# Patient Record
Sex: MALE | Race: OTHER | HISPANIC OR LATINO | URBAN - METROPOLITAN AREA
[De-identification: names, ages, dates, MRNs, and addresses within clinical notes are randomized per-mention and may not be internally consistent; named-entity substitution may affect disease eponyms.]

---

## 2019-07-15 ENCOUNTER — EMERGENCY (EMERGENCY)
Facility: HOSPITAL | Age: 54
LOS: 1 days | Discharge: HOME | End: 2019-07-15
Attending: EMERGENCY MEDICINE
Payer: SELF-PAY

## 2019-07-15 VITALS
OXYGEN SATURATION: 97 % | RESPIRATION RATE: 18 BRPM | SYSTOLIC BLOOD PRESSURE: 134 MMHG | HEART RATE: 61 BPM | DIASTOLIC BLOOD PRESSURE: 78 MMHG | TEMPERATURE: 98 F

## 2019-07-15 VITALS — WEIGHT: 154.1 LBS | HEIGHT: 60 IN

## 2019-07-15 PROCEDURE — T1013: CPT

## 2019-07-15 PROCEDURE — 99284 EMERGENCY DEPT VISIT MOD MDM: CPT

## 2019-07-15 PROCEDURE — 99284 EMERGENCY DEPT VISIT MOD MDM: CPT | Mod: 25

## 2019-07-15 PROCEDURE — 70450 CT HEAD/BRAIN W/O DYE: CPT

## 2019-07-15 PROCEDURE — 70450 CT HEAD/BRAIN W/O DYE: CPT | Mod: 26

## 2019-07-15 NOTE — ED STATDOCS - CLINICAL SUMMARY MEDICAL DECISION MAKING FREE TEXT BOX
Pt with complete left sided facial weakness consistent with Bell's Palsy. Sent in for CT head for earlier event. Will do perform head CT and confirm pt on steroids.

## 2019-07-15 NOTE — ED ADULT TRIAGE NOTE - CHIEF COMPLAINT QUOTE
Pt sent from [facility] clinic for eval of left sided facial droop and numbness since Friday. Pt states that he also has decreased hearing on left side. +facial asymmetry noted. No additional deficits noted.

## 2019-07-15 NOTE — ED STATDOCS - OBJECTIVE STATEMENT
Pt is a 52 y/o M, with PMHx of DM, presenting to the ED with c/o left sided facial numbness. Hx obtained by family at bedside. Pt developed the sxs in the left side of his lips three days ago. Spread to the entire left side of his face. Pt went to Urgent Care yesterday, was diagnosed with Bell's Palsy, and discharged with PO medications. Family states that today while at work, pt noticed that he was unable to drink water properly, and noticed that his left face was "twisty." Pt went to [facility] clinic for eval, has paperwork suggesting Bell's, but was referred to the ED for stroke rule out given comorbidities. Pt admits to mild headache. Denies fever, recent URI sxs, nausea, vomiting, any other unilateral weakness, CP, and SOB.

## 2019-07-15 NOTE — ED STATDOCS - ATTENDING CONTRIBUTION TO CARE
I, Dr. Lynn, performed the initial face to face bedside interview with this patient regarding history of present illness, review of symptoms and relevant past medical, social and family history.  I completed an independent physical examination.  I was the initial provider who evaluated this patient. I have signed out the follow up of any pending tests (i.e. labs, radiological studies) to the ACP.  I have communicated the patient’s plan of care and disposition with the ACP.

## 2019-07-18 PROBLEM — E11.9 TYPE 2 DIABETES MELLITUS WITHOUT COMPLICATIONS: Chronic | Status: ACTIVE | Noted: 2019-07-15

## 2019-07-19 PROBLEM — Z00.00 ENCOUNTER FOR PREVENTIVE HEALTH EXAMINATION: Status: ACTIVE | Noted: 2019-07-19

## 2019-08-01 ENCOUNTER — APPOINTMENT (OUTPATIENT)
Dept: NEUROSURGERY | Facility: CLINIC | Age: 54
End: 2019-08-01
Payer: SELF-PAY

## 2019-08-01 VITALS
HEART RATE: 56 BPM | DIASTOLIC BLOOD PRESSURE: 76 MMHG | HEIGHT: 60 IN | SYSTOLIC BLOOD PRESSURE: 132 MMHG | BODY MASS INDEX: 29.84 KG/M2 | WEIGHT: 152 LBS | TEMPERATURE: 98.4 F

## 2019-08-01 DIAGNOSIS — Z78.9 OTHER SPECIFIED HEALTH STATUS: ICD-10-CM

## 2019-08-01 DIAGNOSIS — Z86.39 PERSONAL HISTORY OF OTHER ENDOCRINE, NUTRITIONAL AND METABOLIC DISEASE: ICD-10-CM

## 2019-08-01 DIAGNOSIS — D32.9 BENIGN NEOPLASM OF MENINGES, UNSPECIFIED: ICD-10-CM

## 2019-08-01 DIAGNOSIS — Z87.891 PERSONAL HISTORY OF NICOTINE DEPENDENCE: ICD-10-CM

## 2019-08-01 PROCEDURE — 99203 OFFICE O/P NEW LOW 30 MIN: CPT

## 2019-08-01 RX ORDER — METFORMIN HYDROCHLORIDE 500 MG/1
500 TABLET, FILM COATED, EXTENDED RELEASE ORAL
Refills: 0 | Status: ACTIVE | COMMUNITY

## 2019-08-01 RX ORDER — ASPIRIN ENTERIC COATED TABLETS 81 MG 81 MG/1
81 TABLET, DELAYED RELEASE ORAL
Refills: 0 | Status: ACTIVE | COMMUNITY

## 2019-08-01 RX ORDER — PRAVASTATIN SODIUM 40 MG/1
40 TABLET ORAL
Refills: 0 | Status: ACTIVE | COMMUNITY

## 2019-08-01 RX ORDER — PERPHENAZINE 2 MG
TABLET ORAL
Refills: 0 | Status: ACTIVE | COMMUNITY

## 2019-08-01 RX ORDER — LISINOPRIL 5 MG/1
5 TABLET ORAL
Refills: 0 | Status: ACTIVE | COMMUNITY

## 2019-08-01 RX ORDER — CHOLECALCIFEROL (VITAMIN D3) 25 MCG
TABLET ORAL
Refills: 0 | Status: ACTIVE | COMMUNITY

## 2019-08-02 NOTE — HISTORY OF PRESENT ILLNESS
[< 3 months] : less than 3 months [FreeTextEntry1] : left facial numbness and bilateral temporal headache [de-identified] : Mr. Rust is a 53 year old male who presents for initial neurosurgical evaluation of a calcified meningioma.  PMH includes HTN.  He mentions he presented to [facility] ER 7/15/2019 with complaints of left facial numbness and headache which prompted CT head imaging to r/o CVA. Discharged in stable condition and recommended to follow up outpatient basis.  At today's visit, he reports intermittent bilateral mild  temporal headaches that come and goes. Pain intensity 2/10.  Denies any nausea or vomiting.  No visual disturbances with exception of intermittent left eye twitching.  Left facial numbness has resolved.    He has never had any seizures.  Denies any numbness/tingling or weakness of the extremities.  No difficulty with ambulation or balance.

## 2019-08-02 NOTE — REVIEW OF SYSTEMS
[As Noted in HPI] : as noted in HPI [Negative] : Heme/Lymph [Numbness] : no numbness [Seizures] : no convulsions [Tingling] : no tingling [Difficulty Walking] : no difficulty walking [Dizziness] : no dizziness

## 2019-08-02 NOTE — REVIEW OF SYSTEMS
[As Noted in HPI] : as noted in HPI [Negative] : Heme/Lymph [Numbness] : no numbness [Tingling] : no tingling [Seizures] : no convulsions [Dizziness] : no dizziness [Difficulty Walking] : no difficulty walking

## 2019-08-02 NOTE — DATA REVIEWED
[de-identified] : IMPRESSION:  5.6 mm calcified meningioma anterior inferior sagittal falx  No acute intracranial findings.

## 2019-08-02 NOTE — ASSESSMENT
[FreeTextEntry1] : Mr. Rust presents with above history and imaging.  He is neurologically intact at today's visit.  He denies change in smell or taste.  Left facial numbness has resolved.   Patient has a CT  finding of a calcified meningioma in the right anterior sagittal falx.  Patient will obtain an MRI brain w/wo contrast to further assess this calcified meningioma. He should follow up after imaging for review.  He and his daughter know to call the office if there are any new or worsening symptoms.

## 2019-08-02 NOTE — DATA REVIEWED
[de-identified] : IMPRESSION:  5.6 mm calcified meningioma anterior inferior sagittal falx  No acute intracranial findings.

## 2019-08-02 NOTE — HISTORY OF PRESENT ILLNESS
[< 3 months] : less than 3 months [FreeTextEntry1] : left facial numbness and bilateral temporal headache [de-identified] : Mr. Rust is a 53 year old male who presents for initial neurosurgical evaluation of a calcified meningioma.  PMH includes HTN.  He mentions he presented to [facility] ER 7/15/2019 with complaints of left facial numbness and headache which prompted CT head imaging to r/o CVA. Discharged in stable condition and recommended to follow up outpatient basis.  At today's visit, he reports intermittent bilateral mild  temporal headaches that come and goes. Pain intensity 2/10.  Denies any nausea or vomiting.  No visual disturbances with exception of intermittent left eye twitching.  Left facial numbness has resolved.    He has never had any seizures.  Denies any numbness/tingling or weakness of the extremities.  No difficulty with ambulation or balance.

## 2019-08-02 NOTE — PHYSICAL EXAM
[General Appearance - Alert] : alert [General Appearance - In No Acute Distress] : in no acute distress [General Appearance - Well Nourished] : well nourished [General Appearance - Well Developed] : well developed [Oriented To Time, Place, And Person] : oriented to person, place, and time [Impaired Insight] : insight and judgment were intact [Affect] : the affect was normal [Mood] : the mood was normal [Person] : oriented to person [Place] : oriented to place [Time] : oriented to time [Short Term Intact] : short term memory intact [Concentration Intact] : normal concentrating ability [Fluency] : fluency intact [Comprehension] : comprehension intact [Cranial Nerves Optic (II)] : visual acuity intact bilaterally,  pupils equal round and reactive to light [Cranial Nerves Oculomotor (III)] : extraocular motion intact [Cranial Nerves Trigeminal (V)] : facial sensation intact symmetrically [Cranial Nerves Facial (VII)] : face symmetrical [Cranial Nerves Glossopharyngeal (IX)] : tongue and palate midline [Cranial Nerves Accessory (XI - Cranial And Spinal)] : head turning and shoulder shrug symmetric [Cranial Nerves Hypoglossal (XII)] : there was no tongue deviation with protrusion [Motor Tone] : muscle tone was normal in all four extremities [Motor Strength] : muscle strength was normal in all four extremities [Sensation Tactile Decrease] : light touch was intact [Sensation Pain / Temperature Decrease] : pain and temperature was intact [Abnormal Walk] : normal gait [Balance] : balance was intact [No Visual Abnormalities] : no visible abnormailities [Normal] : normal [Over the Past 2 Weeks, Have You Felt Down, Depressed, or Hopeless?] : 1.) Over the past 2 weeks, have you felt down, depressed, or hopeless? No [Over the Past 2 Weeks, Have You Felt Little Interest or Pleasure Doing Things?] : 2.) Over the past 2 weeks, have you felt little interest or pleasure doing things? No [FreeTextEntry6] : UE and LE 5/5 bilaterally

## 2019-08-02 NOTE — REASON FOR VISIT
[Post Hospitalization] : a post hospitalization visit [New Patient Visit] : a new patient visit [Referred By: _________] : Patient was referred by GAGANDEEP [Family Member] : family member [FreeTextEntry1] : meningioma

## 2023-07-19 NOTE — PHYSICAL EXAM
[General Appearance - Alert] : alert [General Appearance - In No Acute Distress] : in no acute distress [General Appearance - Well Nourished] : well nourished [General Appearance - Well Developed] : well developed [Oriented To Time, Place, And Person] : oriented to person, place, and time [Impaired Insight] : insight and judgment were intact [Affect] : the affect was normal [Mood] : the mood was normal [Person] : oriented to person [Place] : oriented to place [Time] : oriented to time [Short Term Intact] : short term memory intact [Concentration Intact] : normal concentrating ability [Fluency] : fluency intact [Comprehension] : comprehension intact [Cranial Nerves Optic (II)] : visual acuity intact bilaterally,  pupils equal round and reactive to light [Cranial Nerves Oculomotor (III)] : extraocular motion intact [Cranial Nerves Trigeminal (V)] : facial sensation intact symmetrically [Cranial Nerves Facial (VII)] : face symmetrical [Cranial Nerves Glossopharyngeal (IX)] : tongue and palate midline [Cranial Nerves Accessory (XI - Cranial And Spinal)] : head turning and shoulder shrug symmetric [Cranial Nerves Hypoglossal (XII)] : there was no tongue deviation with protrusion [Motor Tone] : muscle tone was normal in all four extremities [Motor Strength] : muscle strength was normal in all four extremities [Sensation Tactile Decrease] : light touch was intact [Sensation Pain / Temperature Decrease] : pain and temperature was intact [Abnormal Walk] : normal gait [Balance] : balance was intact [No Visual Abnormalities] : no visible abnormailities [Normal] : normal [Over the Past 2 Weeks, Have You Felt Down, Depressed, or Hopeless?] : 1.) Over the past 2 weeks, have you felt down, depressed, or hopeless? No [Over the Past 2 Weeks, Have You Felt Little Interest or Pleasure Doing Things?] : 2.) Over the past 2 weeks, have you felt little interest or pleasure doing things? No [FreeTextEntry6] : UE and LE 5/5 bilaterally Simple: Patient demonstrates quick and easy understanding/Verbalized Understanding

## 2023-12-01 ENCOUNTER — EMERGENCY (EMERGENCY)
Facility: HOSPITAL | Age: 58
LOS: 1 days | Discharge: HOME | End: 2023-12-01
Attending: EMERGENCY MEDICINE
Payer: COMMERCIAL

## 2023-12-01 VITALS
WEIGHT: 143.3 LBS | OXYGEN SATURATION: 98 % | HEART RATE: 75 BPM | SYSTOLIC BLOOD PRESSURE: 123 MMHG | DIASTOLIC BLOOD PRESSURE: 77 MMHG | HEIGHT: 62.99 IN | RESPIRATION RATE: 20 BRPM | TEMPERATURE: 98 F

## 2023-12-01 LAB
ALBUMIN SERPL ELPH-MCNC: 4.2 G/DL — SIGNIFICANT CHANGE UP (ref 3.3–5.2)
ALBUMIN SERPL ELPH-MCNC: 4.2 G/DL — SIGNIFICANT CHANGE UP (ref 3.3–5.2)
ALP SERPL-CCNC: 134 U/L — HIGH (ref 40–120)
ALP SERPL-CCNC: 134 U/L — HIGH (ref 40–120)
ALT FLD-CCNC: 17 U/L — SIGNIFICANT CHANGE UP
ALT FLD-CCNC: 17 U/L — SIGNIFICANT CHANGE UP
ANION GAP SERPL CALC-SCNC: 13 MMOL/L — SIGNIFICANT CHANGE UP (ref 5–17)
ANION GAP SERPL CALC-SCNC: 13 MMOL/L — SIGNIFICANT CHANGE UP (ref 5–17)
APPEARANCE UR: CLEAR — SIGNIFICANT CHANGE UP
APPEARANCE UR: CLEAR — SIGNIFICANT CHANGE UP
AST SERPL-CCNC: 20 U/L — SIGNIFICANT CHANGE UP
AST SERPL-CCNC: 20 U/L — SIGNIFICANT CHANGE UP
BACTERIA # UR AUTO: NEGATIVE /HPF — SIGNIFICANT CHANGE UP
BACTERIA # UR AUTO: NEGATIVE /HPF — SIGNIFICANT CHANGE UP
BASOPHILS # BLD AUTO: 0.02 K/UL — SIGNIFICANT CHANGE UP (ref 0–0.2)
BASOPHILS # BLD AUTO: 0.02 K/UL — SIGNIFICANT CHANGE UP (ref 0–0.2)
BASOPHILS NFR BLD AUTO: 0.2 % — SIGNIFICANT CHANGE UP (ref 0–2)
BASOPHILS NFR BLD AUTO: 0.2 % — SIGNIFICANT CHANGE UP (ref 0–2)
BILIRUB SERPL-MCNC: 0.4 MG/DL — SIGNIFICANT CHANGE UP (ref 0.4–2)
BILIRUB SERPL-MCNC: 0.4 MG/DL — SIGNIFICANT CHANGE UP (ref 0.4–2)
BILIRUB UR-MCNC: NEGATIVE — SIGNIFICANT CHANGE UP
BILIRUB UR-MCNC: NEGATIVE — SIGNIFICANT CHANGE UP
BUN SERPL-MCNC: 16.4 MG/DL — SIGNIFICANT CHANGE UP (ref 8–20)
BUN SERPL-MCNC: 16.4 MG/DL — SIGNIFICANT CHANGE UP (ref 8–20)
CALCIUM SERPL-MCNC: 9.3 MG/DL — SIGNIFICANT CHANGE UP (ref 8.4–10.5)
CALCIUM SERPL-MCNC: 9.3 MG/DL — SIGNIFICANT CHANGE UP (ref 8.4–10.5)
CAST: 0 /LPF — SIGNIFICANT CHANGE UP (ref 0–4)
CAST: 0 /LPF — SIGNIFICANT CHANGE UP (ref 0–4)
CHLORIDE SERPL-SCNC: 97 MMOL/L — SIGNIFICANT CHANGE UP (ref 96–108)
CHLORIDE SERPL-SCNC: 97 MMOL/L — SIGNIFICANT CHANGE UP (ref 96–108)
CO2 SERPL-SCNC: 23 MMOL/L — SIGNIFICANT CHANGE UP (ref 22–29)
CO2 SERPL-SCNC: 23 MMOL/L — SIGNIFICANT CHANGE UP (ref 22–29)
COLOR SPEC: YELLOW — SIGNIFICANT CHANGE UP
COLOR SPEC: YELLOW — SIGNIFICANT CHANGE UP
CREAT SERPL-MCNC: 0.93 MG/DL — SIGNIFICANT CHANGE UP (ref 0.5–1.3)
CREAT SERPL-MCNC: 0.93 MG/DL — SIGNIFICANT CHANGE UP (ref 0.5–1.3)
DIFF PNL FLD: NEGATIVE — SIGNIFICANT CHANGE UP
DIFF PNL FLD: NEGATIVE — SIGNIFICANT CHANGE UP
EGFR: 96 ML/MIN/1.73M2 — SIGNIFICANT CHANGE UP
EGFR: 96 ML/MIN/1.73M2 — SIGNIFICANT CHANGE UP
EOSINOPHIL # BLD AUTO: 0.12 K/UL — SIGNIFICANT CHANGE UP (ref 0–0.5)
EOSINOPHIL # BLD AUTO: 0.12 K/UL — SIGNIFICANT CHANGE UP (ref 0–0.5)
EOSINOPHIL NFR BLD AUTO: 1.2 % — SIGNIFICANT CHANGE UP (ref 0–6)
EOSINOPHIL NFR BLD AUTO: 1.2 % — SIGNIFICANT CHANGE UP (ref 0–6)
GLUCOSE SERPL-MCNC: 208 MG/DL — HIGH (ref 70–99)
GLUCOSE SERPL-MCNC: 208 MG/DL — HIGH (ref 70–99)
GLUCOSE UR QL: 100 MG/DL
GLUCOSE UR QL: 100 MG/DL
HCT VFR BLD CALC: 42.6 % — SIGNIFICANT CHANGE UP (ref 39–50)
HCT VFR BLD CALC: 42.6 % — SIGNIFICANT CHANGE UP (ref 39–50)
HGB BLD-MCNC: 15.5 G/DL — SIGNIFICANT CHANGE UP (ref 13–17)
HGB BLD-MCNC: 15.5 G/DL — SIGNIFICANT CHANGE UP (ref 13–17)
HIV 1 & 2 AB SERPL IA.RAPID: SIGNIFICANT CHANGE UP
HIV 1 & 2 AB SERPL IA.RAPID: SIGNIFICANT CHANGE UP
IMM GRANULOCYTES NFR BLD AUTO: 0.5 % — SIGNIFICANT CHANGE UP (ref 0–0.9)
IMM GRANULOCYTES NFR BLD AUTO: 0.5 % — SIGNIFICANT CHANGE UP (ref 0–0.9)
KETONES UR-MCNC: 15 MG/DL
KETONES UR-MCNC: 15 MG/DL
LACTATE BLDV-MCNC: 1.3 MMOL/L — SIGNIFICANT CHANGE UP (ref 0.5–2)
LACTATE BLDV-MCNC: 1.3 MMOL/L — SIGNIFICANT CHANGE UP (ref 0.5–2)
LEUKOCYTE ESTERASE UR-ACNC: NEGATIVE — SIGNIFICANT CHANGE UP
LEUKOCYTE ESTERASE UR-ACNC: NEGATIVE — SIGNIFICANT CHANGE UP
LYMPHOCYTES # BLD AUTO: 1.2 K/UL — SIGNIFICANT CHANGE UP (ref 1–3.3)
LYMPHOCYTES # BLD AUTO: 1.2 K/UL — SIGNIFICANT CHANGE UP (ref 1–3.3)
LYMPHOCYTES # BLD AUTO: 11.9 % — LOW (ref 13–44)
LYMPHOCYTES # BLD AUTO: 11.9 % — LOW (ref 13–44)
MCHC RBC-ENTMCNC: 32.6 PG — SIGNIFICANT CHANGE UP (ref 27–34)
MCHC RBC-ENTMCNC: 32.6 PG — SIGNIFICANT CHANGE UP (ref 27–34)
MCHC RBC-ENTMCNC: 36.4 GM/DL — HIGH (ref 32–36)
MCHC RBC-ENTMCNC: 36.4 GM/DL — HIGH (ref 32–36)
MCV RBC AUTO: 89.7 FL — SIGNIFICANT CHANGE UP (ref 80–100)
MCV RBC AUTO: 89.7 FL — SIGNIFICANT CHANGE UP (ref 80–100)
MONOCYTES # BLD AUTO: 0.65 K/UL — SIGNIFICANT CHANGE UP (ref 0–0.9)
MONOCYTES # BLD AUTO: 0.65 K/UL — SIGNIFICANT CHANGE UP (ref 0–0.9)
MONOCYTES NFR BLD AUTO: 6.4 % — SIGNIFICANT CHANGE UP (ref 2–14)
MONOCYTES NFR BLD AUTO: 6.4 % — SIGNIFICANT CHANGE UP (ref 2–14)
NEUTROPHILS # BLD AUTO: 8.05 K/UL — HIGH (ref 1.8–7.4)
NEUTROPHILS # BLD AUTO: 8.05 K/UL — HIGH (ref 1.8–7.4)
NEUTROPHILS NFR BLD AUTO: 79.8 % — HIGH (ref 43–77)
NEUTROPHILS NFR BLD AUTO: 79.8 % — HIGH (ref 43–77)
NITRITE UR-MCNC: NEGATIVE — SIGNIFICANT CHANGE UP
NITRITE UR-MCNC: NEGATIVE — SIGNIFICANT CHANGE UP
PH UR: 6.5 — SIGNIFICANT CHANGE UP (ref 5–8)
PH UR: 6.5 — SIGNIFICANT CHANGE UP (ref 5–8)
PLATELET # BLD AUTO: 263 K/UL — SIGNIFICANT CHANGE UP (ref 150–400)
PLATELET # BLD AUTO: 263 K/UL — SIGNIFICANT CHANGE UP (ref 150–400)
POTASSIUM SERPL-MCNC: 4.7 MMOL/L — SIGNIFICANT CHANGE UP (ref 3.5–5.3)
POTASSIUM SERPL-MCNC: 4.7 MMOL/L — SIGNIFICANT CHANGE UP (ref 3.5–5.3)
POTASSIUM SERPL-SCNC: 4.7 MMOL/L — SIGNIFICANT CHANGE UP (ref 3.5–5.3)
POTASSIUM SERPL-SCNC: 4.7 MMOL/L — SIGNIFICANT CHANGE UP (ref 3.5–5.3)
PROT SERPL-MCNC: 7.6 G/DL — SIGNIFICANT CHANGE UP (ref 6.6–8.7)
PROT SERPL-MCNC: 7.6 G/DL — SIGNIFICANT CHANGE UP (ref 6.6–8.7)
PROT UR-MCNC: SIGNIFICANT CHANGE UP MG/DL
PROT UR-MCNC: SIGNIFICANT CHANGE UP MG/DL
RBC # BLD: 4.75 M/UL — SIGNIFICANT CHANGE UP (ref 4.2–5.8)
RBC # BLD: 4.75 M/UL — SIGNIFICANT CHANGE UP (ref 4.2–5.8)
RBC # FLD: 11.7 % — SIGNIFICANT CHANGE UP (ref 10.3–14.5)
RBC # FLD: 11.7 % — SIGNIFICANT CHANGE UP (ref 10.3–14.5)
RBC CASTS # UR COMP ASSIST: 1 /HPF — SIGNIFICANT CHANGE UP (ref 0–4)
RBC CASTS # UR COMP ASSIST: 1 /HPF — SIGNIFICANT CHANGE UP (ref 0–4)
SODIUM SERPL-SCNC: 133 MMOL/L — LOW (ref 135–145)
SODIUM SERPL-SCNC: 133 MMOL/L — LOW (ref 135–145)
SP GR SPEC: 1.02 — SIGNIFICANT CHANGE UP (ref 1–1.03)
SP GR SPEC: 1.02 — SIGNIFICANT CHANGE UP (ref 1–1.03)
SQUAMOUS # UR AUTO: 0 /HPF — SIGNIFICANT CHANGE UP (ref 0–5)
SQUAMOUS # UR AUTO: 0 /HPF — SIGNIFICANT CHANGE UP (ref 0–5)
UROBILINOGEN FLD QL: 1 MG/DL — SIGNIFICANT CHANGE UP (ref 0.2–1)
UROBILINOGEN FLD QL: 1 MG/DL — SIGNIFICANT CHANGE UP (ref 0.2–1)
WBC # BLD: 10.09 K/UL — SIGNIFICANT CHANGE UP (ref 3.8–10.5)
WBC # BLD: 10.09 K/UL — SIGNIFICANT CHANGE UP (ref 3.8–10.5)
WBC # FLD AUTO: 10.09 K/UL — SIGNIFICANT CHANGE UP (ref 3.8–10.5)
WBC # FLD AUTO: 10.09 K/UL — SIGNIFICANT CHANGE UP (ref 3.8–10.5)
WBC UR QL: 0 /HPF — SIGNIFICANT CHANGE UP (ref 0–5)
WBC UR QL: 0 /HPF — SIGNIFICANT CHANGE UP (ref 0–5)

## 2023-12-01 PROCEDURE — 87086 URINE CULTURE/COLONY COUNT: CPT

## 2023-12-01 PROCEDURE — 76870 US EXAM SCROTUM: CPT

## 2023-12-01 PROCEDURE — 99285 EMERGENCY DEPT VISIT HI MDM: CPT

## 2023-12-01 PROCEDURE — 36415 COLL VENOUS BLD VENIPUNCTURE: CPT

## 2023-12-01 PROCEDURE — 54700 I&D EPDIDYMS TSTIS&/SCROT SP: CPT

## 2023-12-01 PROCEDURE — G1004: CPT

## 2023-12-01 PROCEDURE — 74177 CT ABD & PELVIS W/CONTRAST: CPT | Mod: 26,MF

## 2023-12-01 PROCEDURE — 87591 N.GONORRHOEAE DNA AMP PROB: CPT

## 2023-12-01 PROCEDURE — 54700 I&D EPDIDYMS TSTIS&/SCROT SP: CPT | Mod: RT

## 2023-12-01 PROCEDURE — 85025 COMPLETE CBC W/AUTO DIFF WBC: CPT

## 2023-12-01 PROCEDURE — 83605 ASSAY OF LACTIC ACID: CPT

## 2023-12-01 PROCEDURE — 87040 BLOOD CULTURE FOR BACTERIA: CPT

## 2023-12-01 PROCEDURE — 86703 HIV-1/HIV-2 1 RESULT ANTBDY: CPT

## 2023-12-01 PROCEDURE — 99284 EMERGENCY DEPT VISIT MOD MDM: CPT | Mod: 25

## 2023-12-01 PROCEDURE — 87186 SC STD MICRODIL/AGAR DIL: CPT

## 2023-12-01 PROCEDURE — 87070 CULTURE OTHR SPECIMN AEROBIC: CPT

## 2023-12-01 PROCEDURE — 81001 URINALYSIS AUTO W/SCOPE: CPT

## 2023-12-01 PROCEDURE — 76870 US EXAM SCROTUM: CPT | Mod: 26

## 2023-12-01 PROCEDURE — T1013: CPT

## 2023-12-01 PROCEDURE — 80053 COMPREHEN METABOLIC PANEL: CPT

## 2023-12-01 PROCEDURE — 74177 CT ABD & PELVIS W/CONTRAST: CPT | Mod: MF

## 2023-12-01 RX ORDER — SODIUM CHLORIDE 9 MG/ML
1000 INJECTION INTRAMUSCULAR; INTRAVENOUS; SUBCUTANEOUS ONCE
Refills: 0 | Status: COMPLETED | OUTPATIENT
Start: 2023-12-01 | End: 2023-12-01

## 2023-12-01 RX ORDER — LIDOCAINE HCL 20 MG/ML
5 VIAL (ML) INJECTION ONCE
Refills: 0 | Status: DISCONTINUED | OUTPATIENT
Start: 2023-12-01 | End: 2023-12-08

## 2023-12-01 RX ORDER — IBUPROFEN 200 MG
1 TABLET ORAL
Qty: 15 | Refills: 0
Start: 2023-12-01 | End: 2023-12-05

## 2023-12-01 RX ORDER — ACETAMINOPHEN 500 MG
1000 TABLET ORAL ONCE
Refills: 0 | Status: DISCONTINUED | OUTPATIENT
Start: 2023-12-01 | End: 2023-12-08

## 2023-12-01 RX ADMIN — SODIUM CHLORIDE 2000 MILLILITER(S): 9 INJECTION INTRAMUSCULAR; INTRAVENOUS; SUBCUTANEOUS at 13:16

## 2023-12-01 NOTE — ED PROVIDER NOTE - OBJECTIVE STATEMENT
57 y/o M with PMHx DM2 (metformin), HTN, HLD and no reported PSHx p/w swelling/pain over his right testicle x 2 weeks now getting worse and bleeding today. Pt went to a clinic yesterday, prescribed bactrim and was told to go to a hospital. No fever/chills, n/v, abdominal pain, back pain, dysuria/hematuria, rash, penile discharge, weakness, sob, cp, cough, and with no other c/o. Denies prior hx of STD's. 57 y/o M with PMHx DM2 (metformin), HTN, HLD and no reported PSHx p/w swelling/pain over his right testicle x 2 weeks now getting worse and bleeding today. Pt went to a clinic yesterday, prescribed bactrim DS x 10 days and was told to go to a hospital. No fever/chills, n/v, abdominal pain, back pain, dysuria/hematuria, rash, penile discharge, weakness, sob, cp, cough, and with no other c/o. Denies prior hx of STD's.

## 2023-12-01 NOTE — ED PROVIDER NOTE - NSFOLLOWUPCLINICS_GEN_ALL_ED_FT
[address] [place] [address]  Phone: [phone]  Fax:   Follow Up Time: 7-10 Days

## 2023-12-01 NOTE — ED ADULT NURSE NOTE - OBJECTIVE STATEMENT
Received report from Sharad BHATIA. Pt here with c/o of R testicle pain x2 weeks now worst today. Reports being prescribed bactrim by clinic yesterday and told to go to hospital. Pt also endorses some bleeding from R testicle. Denies chest pain, sob, fever, chills, penile discharge. pt educated on plan of care, pt able to successfully teach back plan of care to RN, RN will continue to reeducate pt during hospital stay.

## 2023-12-01 NOTE — ED PROVIDER NOTE - CLINICAL SUMMARY MEDICAL DECISION MAKING FREE TEXT BOX
57 y/o M with PMHx DM2 (metformin), HTN, HLD and no reported PSHx p/w swelling/pain over his right testicle x 2 weeks now getting worse and bleeding today.  Will rule out testicular abscess vs other  etiologies. Plan for IVF, labs, imaging, and will reassess. 57 y/o M with PMHx DM2 (metformin), HTN, HLD and no reported PSHx p/w swelling/pain over his right testicle x 2 weeks now getting worse and bleeding today.  Will rule out testicular abscess vs other  etiologies. Plan for IVF, labs, imaging, and will reassess.    Labs reviewed, grossly unremarkable. Mildly elevated glucose 208. No leukocytosis. No lactate.   UA grossly unremarkable  Urine Cx: sent  GC amplification: pending    CT abdomen: Scrotal wall edema with a most likely right lateral scrotal wall abscess. Mildly striated right renal enhancement pattern suggestive for acute pyelonephritis. Diffuse bladder wall thickening suggests cystitis. Correlate with urinalysis. Possible gastroenteritis    US testicles: Right scrotal wall thickening, edema, and hyperemia, which may indicate soft tissue infection/cellulitis. No focal intratesticular mass or abscess.  Small bilateral hydroceles.    Uro consulted, recs appreciated. Incision performed at bedside. Recommends  7 days worth of Bactrim DS BID (already prescribed while at clinic this week). Local wound care with warm compresses to help with drainage and edema. Clean with soap and water, cover with dry dressing. Follow up PMD 5-7 days. May follow up with Dr Figueroa if needed    Strict ED return precautions given if any new or worsening symptoms.

## 2023-12-01 NOTE — ED PROVIDER NOTE - CARE PROVIDER_API CALL
Timoteo Figueroa [facility]  [address] [place] [id number]  Phone: [phone]  Fax: [phone]  Follow Up Time: 1-3 Days

## 2023-12-01 NOTE — ED PROVIDER NOTE - PATIENT PORTAL LINK FT
You can access the FollowMyHealth Patient Portal offered by [facility] by registering at the following website: [email or web address]. By joining [email or web address]’s FollowMyHealth portal, you will also be able to view your health information using other applications (apps) compatible with our system.

## 2023-12-01 NOTE — ED PROVIDER NOTE - NSFOLLOWUPINSTRUCTIONS_ED_ALL_ED_FT
Please continue taking all medication as prescribed.   1)Follow up with your PCP in 1 week  2) Follow up with urology clinic within 2-3 days  Return to the emergency room if you are experiencing any new or worsening symptoms    Abscess    An abscess is an infected area that contains a collection of pus and debris. It can occur in almost any part of the body and occurs when the tissue gets infection. Symptoms include a painful mass that is red, warm, tender that might break open and HAVE drainage. If your health care provider gave you antibiotics make sure to take the full course and do not stop even if feeling better.     SEEK IMMEDIATE MEDICAL CARE IF YOU HAVE ANY OF THE FOLLOWING SYMPTOMS: chills, fever, muscle aches, or red streaking from the area.

## 2023-12-01 NOTE — ED PROVIDER NOTE - PHYSICAL EXAMINATION
Constitutional: Awake, alert and oriented. In no acute distress. Well appearing.  HEENT: NC/AT. Moist mucous membranes.  Eyes: No scleral icterus. EOMI.  Neck:. Soft and supple. Full ROM without pain.  Cardiac: Regular rate and regular rhythm. +S1/S2. Peripheral pulses 2+ and symmetric. No LE edema.  Respiratory: Speaking in full sentences. No evidence of respiratory distress. No wheezes, rales or rhonchi.  Abdomen: Soft, non-distended and non tender Normal bowel sounds in all 4 quadrants. No guarding or rebound. no CVAT  : (+) Rt testicular induration a/w mild drainage. No penile discharge.   Back: Spine midline and non-tender.   Skin: No rashes, abrasions or lacerations.  Neuro: Awake, alert & oriented x 3.   Psych: calm, cooperative, normal affect

## 2023-12-01 NOTE — CONSULT NOTE ADULT - SUBJECTIVE AND OBJECTIVE BOX
HPI:  57 y/o M PMH HTN, HD, DM on Metformin presents to the ED today with 2 week h/o right testicular swelling and discomfort, worsening over the last 2 weeks.  Per pt's daughter who translated, pt went to a clinic yesterday and given Bactrim for infection to right testicle.  Overnight, area began to spontaneously drain pus and blood, and so he came to the ED today.  Denies F/V, abd pain, "a little nausea" however able to tolerate normal PO, "a little discomfort with urination" although not currently, no drainage from penis, no pain with BN, no back pain.      ICU Vital Signs Last 24 Hrs  T(C): 36.7 (01 Dec 2023 11:19), Max: 36.7 (01 Dec 2023 11:19)  T(F): 98 (01 Dec 2023 11:19), Max: 98 (01 Dec 2023 11:19)  HR: 75 (01 Dec 2023 11:19) (75 - 75)  BP: 123/77 (01 Dec 2023 11:19) (123/77 - 123/77)  BP(mean): --  ABP: --  ABP(mean): --  RR: 20 (01 Dec 2023 11:19) (20 - 20)  SpO2: 98% (01 Dec 2023 11:19) (98% - 98%)    O2 Parameters below as of 01 Dec 2023 11:19  Patient On (Oxygen Delivery Method): room air            I&O's Detail            MEDICATIONS  (STANDING):  lidocaine 1% Injectable 5 milliLiter(s) Local Injection Once    MEDICATIONS  (PRN):        PHYSICAL EXAM:    Gen:  NAD laying in bed      Neurological:  A&O x4    Pulmonary: Unlabored on RA    Cardiovascular:  NSR    Gastrointestinal:  Soft, grossly nontender    Genitourinary:  Circumcised penis, no drainage, slightly increased Rt inguinal adenopathy, Lt groin lymph node WNL   Lt testicle grossly nontender, normal in appearance  Rt testicle with area of induration at approx 7 o'clock position with area of spontaneous drainage, mild local ttp      Back:  NO CVAT BL    Skin:  No erythema or warmth to BL testicles or groins              LABS:  CBC Full  -  ( 01 Dec 2023 13:05 )  WBC Count : 10.09 K/uL  RBC Count : 4.75 M/uL  Hemoglobin : 15.5 g/dL  Hematocrit : 42.6 %  Platelet Count - Automated : 263 K/uL  Mean Cell Volume : 89.7 fl  Mean Cell Hemoglobin : 32.6 pg  Mean Cell Hemoglobin Concentration : 36.4 gm/dL  Auto Neutrophil # : 8.05 K/uL  Auto Lymphocyte # : 1.20 K/uL  Auto Monocyte # : 0.65 K/uL  Auto Eosinophil # : 0.12 K/uL  Auto Basophil # : 0.02 K/uL  Auto Neutrophil % : 79.8 %  Auto Lymphocyte % : 11.9 %  Auto Monocyte % : 6.4 %  Auto Eosinophil % : 1.2 %  Auto Basophil % : 0.2 %        133<L>  |  97  |  16.4  ----------------------------<  208<H>  4.7   |  23.0  |  0.93    Ca    9.3      01 Dec 2023 13:05    TPro  7.6  /  Alb  4.2  /  TBili  0.4  /  DBili  x   /  AST  20  /  ALT  17  /  AlkPhos  134<H>        Urinalysis Basic - ( 01 Dec 2023 14:00 )    Color: Yellow / Appearance: Clear / S.024 / pH: x  Gluc: x / Ketone: 15 mg/dL  / Bili: Negative / Urobili: 1.0 mg/dL   Blood: x / Protein: Trace mg/dL / Nitrite: Negative   Leuk Esterase: Negative / RBC: 1 /HPF / WBC 0 /HPF   Sq Epi: x / Non Sq Epi: 0 /HPF / Bacteria: Negative /HPF      RECENT CULTURES:      LIVER FUNCTIONS - ( 01 Dec 2023 13:05 )  Alb: 4.2 g/dL / Pro: 7.6 g/dL / ALK PHOS: 134 U/L / ALT: 17 U/L / AST: 20 U/L / GGT: x               CAPILLARY BLOOD GLUCOSE      RADIOLOGY & ADDITIONAL STUDIES:      ACC: [id number] EXAM:  CT ABDOMEN AND PELVIS IC   ORDERED BY: ANA MCKEON     PROCEDURE DATE:  2023          INTERPRETATION:  CLINICAL INFORMATION: Right scrotal lump    COMPARISON: Scrotal ultrasound 2023    CONTRAST/COMPLICATIONS:  IV Contrast: Omnipaque 350  90 cc administered   10 cc discarded  Oral Contrast: NONE  Complications: None reported at time of study completion    PROCEDURE:  CT of the Abdomen and Pelvis was performed.  Sagittal and coronal reformats were performed.    FINDINGS:  LOWER CHEST: Tiny hiatal hernia. Respiratory motion artifact.   Subsegmental atelectasis within lingula.    LIVER: Within normal limits.  BILE DUCTS: Normal caliber.  GALLBLADDER: Nondistended  SPLEEN: Within normal limits.  PANCREAS: Within normal limits.  ADRENALS: Within normal limits.  KIDNEYS/URETERS: Mildly striated right renal enhancement pattern   suggestive for acute pyelonephritis.  Homogeneous enhancement of the left kidney without hydronephrosis.   Subcentimeter left lower pole hypodensity too small for definitive   characterization    BLADDER: Diffuse bladder wall thickening  REPRODUCTIVE ORGANS: Scrotal wall edema greater on the right where there   is a superficial likely cutaneous 2.3 x 0.8 cm fluid collection likely a   scrotal wall abscess. No scrotal wall air.  Prostate is within normal size limits    BOWEL: Decompressed stomach suggests wall thickening however this may be   accentuated by decompressed status.   No bowel obstruction. Appendix is normal. Wall thickening of duodenum   and left upper quadrant proximal jejunum  PERITONEUM: No ascites.  VESSELS: Atherosclerotic changes.  RETROPERITONEUM/LYMPH NODES: No lymphadenopathy.  ABDOMINAL WALL: Small fat-containing left inguinal hernia. Scrotal wall   edema as above  BONES: Multilevel degenerative changes throughout the spine.    IMPRESSION:    Scrotal wall edema with a most likely right lateral scrotal wall abscess    Mildly striated right renal enhancement pattern suggestive for acute   pyelonephritis.    Diffuse bladder wall thickening suggests cystitis. Correlate with   urinalysis    Possible gastroenteritis        ACC: [id number] EXAM:  US SCROTUM AND CONTENTS   ORDERED BY: ANA MCKEON     PROCEDURE DATE:  2023          INTERPRETATION:  CLINICAL INFORMATION: Right testicular lump    COMPARISON: None available.    TECHNIQUE: Testicular ultrasound utilizing color and spectral Doppler.    FINDINGS:    RIGHT:  Right testis: 3.8 cm x 2.0 cm x 2.9 cm. Normal echogenicity and   echotexture with no masses or areas of architectural distortion. Normal   arterial and venous blood flow pattern.  Right epididymis: Within normal limits.  Right hydrocele: Small.  Right varicocele: None.    Mild right scrotal wall thickening, edema, and hyperemia. No focal   collection.    LEFT:  Left testis: 2.9 cm x 1.9 cm x 2.8 cm. Normal echogenicity and   echotexture with no masses or areas of architectural distortion. Normal   arterial and venous blood flow pattern.  Left epididymis: Epididymal head cysts measuring 4 x 4 mm.  Left hydrocele: Small.  Left varicocele: None.        IMPRESSION:    Right scrotal wall thickening, edema, and hyperemia, which may indicate   soft tissue infection/cellulitis. No focal intratesticular mass or   abscess.    Small bilateral hydroceles.            --- End of Report ---    ASSESSMENT/PLAN:      58yMale presenting with:  Rt scrotal abscess    Imaging and labs reviewed.    Small incision made to right testicle at area of spontaneous drainage to allow for better drainage    Would order UCx to ensure no UTI with CT findings, although with a grossly negative UA, I expect the UCx to be negative  Would d/c pt home on 7 days worth of Bactrim DS BID  Local wound care with warm compresses to help with drainage and edema  Clean with soap and water, cover with dry dressing  Follow up PMD 5-7 days  May follow up with Dr Figueroa if needed  Patient is advised to RETURN TO THE EMERGENCY DEPARTMENT for any of the following - worsening pain, fever/chills, nausea/vomiting, altered mental status, chest pain, shortness of breath, or any other new / worsening symptom. to E      CRITICAL CARE TIME SPENT:   HPI:  59 y/o M PMH HTN, HD, DM on Metformin presents to the ED today with 2 week h/o right testicular swelling and discomfort, worsening over the last 2 weeks.  Per pt's daughter who translated, pt went to a clinic yesterday and given Bactrim for infection to right testicle.  Overnight, area began to spontaneously drain pus and blood, and so he came to the ED today.  Denies F/V, abd pain, "a little nausea" however able to tolerate normal PO, "a little discomfort with urination" although not currently, no drainage from penis, no pain with BN, no back pain.      ICU Vital Signs Last 24 Hrs  T(C): 36.7 (01 Dec 2023 11:19), Max: 36.7 (01 Dec 2023 11:19)  T(F): 98 (01 Dec 2023 11:19), Max: 98 (01 Dec 2023 11:19)  HR: 75 (01 Dec 2023 11:19) (75 - 75)  BP: 123/77 (01 Dec 2023 11:19) (123/77 - 123/77)  BP(mean): --  ABP: --  ABP(mean): --  RR: 20 (01 Dec 2023 11:19) (20 - 20)  SpO2: 98% (01 Dec 2023 11:19) (98% - 98%)    O2 Parameters below as of 01 Dec 2023 11:19  Patient On (Oxygen Delivery Method): room air            I&O's Detail            MEDICATIONS  (STANDING):  lidocaine 1% Injectable 5 milliLiter(s) Local Injection Once    MEDICATIONS  (PRN):        PHYSICAL EXAM:    Gen:  NAD laying in bed      Neurological:  A&O x4    Pulmonary: Unlabored on RA    Cardiovascular:  NSR    Gastrointestinal:  Soft, grossly nontender    Genitourinary:  Circumcised penis, no drainage, slightly increased Rt inguinal adenopathy, Lt groin lymph node WNL   Lt testicle grossly nontender, normal in appearance  Rt testicle with area of induration at approx 7 o'clock position with area of spontaneous drainage, mild local ttp      Back:  NO CVAT BL    Skin:  No erythema or warmth to BL testicles or groins              LABS:  CBC Full  -  ( 01 Dec 2023 13:05 )  WBC Count : 10.09 K/uL  RBC Count : 4.75 M/uL  Hemoglobin : 15.5 g/dL  Hematocrit : 42.6 %  Platelet Count - Automated : 263 K/uL  Mean Cell Volume : 89.7 fl  Mean Cell Hemoglobin : 32.6 pg  Mean Cell Hemoglobin Concentration : 36.4 gm/dL  Auto Neutrophil # : 8.05 K/uL  Auto Lymphocyte # : 1.20 K/uL  Auto Monocyte # : 0.65 K/uL  Auto Eosinophil # : 0.12 K/uL  Auto Basophil # : 0.02 K/uL  Auto Neutrophil % : 79.8 %  Auto Lymphocyte % : 11.9 %  Auto Monocyte % : 6.4 %  Auto Eosinophil % : 1.2 %  Auto Basophil % : 0.2 %        133<L>  |  97  |  16.4  ----------------------------<  208<H>  4.7   |  23.0  |  0.93    Ca    9.3      01 Dec 2023 13:05    TPro  7.6  /  Alb  4.2  /  TBili  0.4  /  DBili  x   /  AST  20  /  ALT  17  /  AlkPhos  134<H>        Urinalysis Basic - ( 01 Dec 2023 14:00 )    Color: Yellow / Appearance: Clear / S.024 / pH: x  Gluc: x / Ketone: 15 mg/dL  / Bili: Negative / Urobili: 1.0 mg/dL   Blood: x / Protein: Trace mg/dL / Nitrite: Negative   Leuk Esterase: Negative / RBC: 1 /HPF / WBC 0 /HPF   Sq Epi: x / Non Sq Epi: 0 /HPF / Bacteria: Negative /HPF      RECENT CULTURES:      LIVER FUNCTIONS - ( 01 Dec 2023 13:05 )  Alb: 4.2 g/dL / Pro: 7.6 g/dL / ALK PHOS: 134 U/L / ALT: 17 U/L / AST: 20 U/L / GGT: x               CAPILLARY BLOOD GLUCOSE      RADIOLOGY & ADDITIONAL STUDIES:      ACC: [id number] EXAM:  CT ABDOMEN AND PELVIS IC   ORDERED BY: ANA MCKEON     PROCEDURE DATE:  2023          INTERPRETATION:  CLINICAL INFORMATION: Right scrotal lump    COMPARISON: Scrotal ultrasound 2023    CONTRAST/COMPLICATIONS:  IV Contrast: Omnipaque 350  90 cc administered   10 cc discarded  Oral Contrast: NONE  Complications: None reported at time of study completion    PROCEDURE:  CT of the Abdomen and Pelvis was performed.  Sagittal and coronal reformats were performed.    FINDINGS:  LOWER CHEST: Tiny hiatal hernia. Respiratory motion artifact.   Subsegmental atelectasis within lingula.    LIVER: Within normal limits.  BILE DUCTS: Normal caliber.  GALLBLADDER: Nondistended  SPLEEN: Within normal limits.  PANCREAS: Within normal limits.  ADRENALS: Within normal limits.  KIDNEYS/URETERS: Mildly striated right renal enhancement pattern   suggestive for acute pyelonephritis.  Homogeneous enhancement of the left kidney without hydronephrosis.   Subcentimeter left lower pole hypodensity too small for definitive   characterization    BLADDER: Diffuse bladder wall thickening  REPRODUCTIVE ORGANS: Scrotal wall edema greater on the right where there   is a superficial likely cutaneous 2.3 x 0.8 cm fluid collection likely a   scrotal wall abscess. No scrotal wall air.  Prostate is within normal size limits    BOWEL: Decompressed stomach suggests wall thickening however this may be   accentuated by decompressed status.   No bowel obstruction. Appendix is normal. Wall thickening of duodenum   and left upper quadrant proximal jejunum  PERITONEUM: No ascites.  VESSELS: Atherosclerotic changes.  RETROPERITONEUM/LYMPH NODES: No lymphadenopathy.  ABDOMINAL WALL: Small fat-containing left inguinal hernia. Scrotal wall   edema as above  BONES: Multilevel degenerative changes throughout the spine.    IMPRESSION:    Scrotal wall edema with a most likely right lateral scrotal wall abscess    Mildly striated right renal enhancement pattern suggestive for acute   pyelonephritis.    Diffuse bladder wall thickening suggests cystitis. Correlate with   urinalysis    Possible gastroenteritis        ACC: [id number] EXAM:  US SCROTUM AND CONTENTS   ORDERED BY: ANA MCKEON     PROCEDURE DATE:  2023          INTERPRETATION:  CLINICAL INFORMATION: Right testicular lump    COMPARISON: None available.    TECHNIQUE: Testicular ultrasound utilizing color and spectral Doppler.    FINDINGS:    RIGHT:  Right testis: 3.8 cm x 2.0 cm x 2.9 cm. Normal echogenicity and   echotexture with no masses or areas of architectural distortion. Normal   arterial and venous blood flow pattern.  Right epididymis: Within normal limits.  Right hydrocele: Small.  Right varicocele: None.    Mild right scrotal wall thickening, edema, and hyperemia. No focal   collection.    LEFT:  Left testis: 2.9 cm x 1.9 cm x 2.8 cm. Normal echogenicity and   echotexture with no masses or areas of architectural distortion. Normal   arterial and venous blood flow pattern.  Left epididymis: Epididymal head cysts measuring 4 x 4 mm.  Left hydrocele: Small.  Left varicocele: None.        IMPRESSION:    Right scrotal wall thickening, edema, and hyperemia, which may indicate   soft tissue infection/cellulitis. No focal intratesticular mass or   abscess.    Small bilateral hydroceles.            --- End of Report ---    ASSESSMENT/PLAN:      58yMale presenting with:  Rt scrotal abscess    Imaging and labs reviewed.    Small incision made to right testicle at area of spontaneous drainage to allow for better drainage    Would order UCx to ensure no UTI with CT findings, although with a grossly negative UA, I expect the UCx to be negative  Wound Cx sent  Would d/c pt home on 7 days worth of Bactrim DS BID  Local wound care with warm compresses to help with drainage and edema  Clean with soap and water, cover with dry dressing  Follow up PMD 5-7 days  May follow up with Dr Figueroa if needed  Patient is advised to RETURN TO THE EMERGENCY DEPARTMENT for any of the following - worsening pain, fever/chills, nausea/vomiting, altered mental status, chest pain, shortness of breath, or any other new / worsening symptom.     D/W Dr Figueroa      CRITICAL CARE TIME SPENT:

## 2023-12-01 NOTE — ED ADULT NURSE NOTE - NSFALLUNIVINTERV_ED_ALL_ED
Bed/Stretcher in lowest position, wheels locked, appropriate side rails in place/Call bell, personal items and telephone in reach/Instruct patient to call for assistance before getting out of bed/chair/stretcher/Non-slip footwear applied when patient is off stretcher/[place] to call system/Physically safe environment - no spills, clutter or unnecessary equipment/Purposeful proactive rounding/Room/bathroom lighting operational, light cord in reach
normal

## 2023-12-01 NOTE — ED PROVIDER NOTE - NS ED ATTENDING STATEMENT MOD
This was a shared visit with the ALBERTA. I reviewed and verified the documentation and independently performed the documented:

## 2023-12-01 NOTE — ED PROVIDER NOTE - ATTENDING APP SHARED VISIT CONTRIBUTION OF CARE
scrotal abscess/infection in diabetic pt  pe as documented  agree w lab iv cultures imaging  agree w eval and mngt

## 2023-12-03 LAB
-  AMPICILLIN/SULBACTAM: SIGNIFICANT CHANGE UP
-  AMPICILLIN/SULBACTAM: SIGNIFICANT CHANGE UP
-  CEFAZOLIN: SIGNIFICANT CHANGE UP
-  CEFAZOLIN: SIGNIFICANT CHANGE UP
-  CLINDAMYCIN: SIGNIFICANT CHANGE UP
-  CLINDAMYCIN: SIGNIFICANT CHANGE UP
-  ERYTHROMYCIN: SIGNIFICANT CHANGE UP
-  ERYTHROMYCIN: SIGNIFICANT CHANGE UP
-  GENTAMICIN: SIGNIFICANT CHANGE UP
-  GENTAMICIN: SIGNIFICANT CHANGE UP
-  OXACILLIN: SIGNIFICANT CHANGE UP
-  OXACILLIN: SIGNIFICANT CHANGE UP
-  PENICILLIN: SIGNIFICANT CHANGE UP
-  PENICILLIN: SIGNIFICANT CHANGE UP
-  RIFAMPIN: SIGNIFICANT CHANGE UP
-  RIFAMPIN: SIGNIFICANT CHANGE UP
-  TETRACYCLINE: SIGNIFICANT CHANGE UP
-  TETRACYCLINE: SIGNIFICANT CHANGE UP
-  TRIMETHOPRIM/SULFAMETHOXAZOLE: SIGNIFICANT CHANGE UP
-  TRIMETHOPRIM/SULFAMETHOXAZOLE: SIGNIFICANT CHANGE UP
-  VANCOMYCIN: SIGNIFICANT CHANGE UP
-  VANCOMYCIN: SIGNIFICANT CHANGE UP
CULTURE RESULTS: NO GROWTH — SIGNIFICANT CHANGE UP
CULTURE RESULTS: NO GROWTH — SIGNIFICANT CHANGE UP
METHOD TYPE: SIGNIFICANT CHANGE UP
METHOD TYPE: SIGNIFICANT CHANGE UP
SPECIMEN SOURCE: SIGNIFICANT CHANGE UP
SPECIMEN SOURCE: SIGNIFICANT CHANGE UP

## 2023-12-04 LAB
N GONORRHOEA RRNA SPEC QL NAA+PROBE: SIGNIFICANT CHANGE UP
N GONORRHOEA RRNA SPEC QL NAA+PROBE: SIGNIFICANT CHANGE UP
SPECIMEN SOURCE: SIGNIFICANT CHANGE UP
SPECIMEN SOURCE: SIGNIFICANT CHANGE UP

## 2023-12-06 LAB
CULTURE RESULTS: ABNORMAL
CULTURE RESULTS: ABNORMAL
CULTURE RESULTS: SIGNIFICANT CHANGE UP
CULTURE RESULTS: SIGNIFICANT CHANGE UP
ORGANISM # SPEC MICROSCOPIC CNT: ABNORMAL
ORGANISM # SPEC MICROSCOPIC CNT: ABNORMAL
ORGANISM # SPEC MICROSCOPIC CNT: SIGNIFICANT CHANGE UP
ORGANISM # SPEC MICROSCOPIC CNT: SIGNIFICANT CHANGE UP
SPECIMEN SOURCE: SIGNIFICANT CHANGE UP

## 2023-12-07 ENCOUNTER — APPOINTMENT (OUTPATIENT)
Dept: UROLOGY | Facility: CLINIC | Age: 58
End: 2023-12-07
Payer: MEDICAID

## 2023-12-07 VITALS
HEIGHT: 60 IN | RESPIRATION RATE: 16 BRPM | HEART RATE: 77 BPM | WEIGHT: 152 LBS | DIASTOLIC BLOOD PRESSURE: 75 MMHG | OXYGEN SATURATION: 97 % | SYSTOLIC BLOOD PRESSURE: 117 MMHG | BODY MASS INDEX: 29.84 KG/M2

## 2023-12-07 LAB
BILIRUB UR QL STRIP: NORMAL
CLARITY UR: CLEAR
COLLECTION METHOD: NORMAL
GLUCOSE UR-MCNC: ABNORMAL
HCG UR QL: 0.2 EU/DL
HGB UR QL STRIP.AUTO: NORMAL
KETONES UR-MCNC: NORMAL
LEUKOCYTE ESTERASE UR QL STRIP: NORMAL
NITRITE UR QL STRIP: NORMAL
PH UR STRIP: 6
PROT UR STRIP-MCNC: NORMAL
SP GR UR STRIP: 1.01

## 2023-12-07 PROCEDURE — 81003 URINALYSIS AUTO W/O SCOPE: CPT | Mod: QW

## 2023-12-07 PROCEDURE — 99203 OFFICE O/P NEW LOW 30 MIN: CPT

## 2023-12-21 ENCOUNTER — APPOINTMENT (OUTPATIENT)
Dept: UROLOGY | Facility: CLINIC | Age: 58
End: 2023-12-21
Payer: MEDICAID

## 2023-12-21 LAB
BILIRUB UR QL STRIP: NORMAL
CLARITY UR: CLEAR
COLLECTION METHOD: NORMAL
GLUCOSE UR-MCNC: ABNORMAL
HCG UR QL: 0.2 EU/DL
HGB UR QL STRIP.AUTO: NORMAL
KETONES UR-MCNC: NORMAL
LEUKOCYTE ESTERASE UR QL STRIP: NORMAL
NITRITE UR QL STRIP: NORMAL
PH UR STRIP: 5.5
PROT UR STRIP-MCNC: NORMAL
SP GR UR STRIP: >1.03

## 2023-12-21 PROCEDURE — 81003 URINALYSIS AUTO W/O SCOPE: CPT | Mod: QW

## 2023-12-21 PROCEDURE — 99213 OFFICE O/P EST LOW 20 MIN: CPT | Mod: 25

## 2023-12-25 NOTE — PHYSICAL EXAM
[General Appearance - Well Developed] : well developed [General Appearance - Well Nourished] : well nourished [] : no respiratory distress [General Appearance - In No Acute Distress] : no acute distress [Testes Tenderness] : no tenderness of the testes [Testes Mass (___cm)] : there were no testicular masses [de-identified] : Abscess healed, no erythema or drainae, mild induration